# Patient Record
Sex: FEMALE | Race: WHITE
[De-identification: names, ages, dates, MRNs, and addresses within clinical notes are randomized per-mention and may not be internally consistent; named-entity substitution may affect disease eponyms.]

---

## 2020-01-13 ENCOUNTER — APPOINTMENT (OUTPATIENT)
Dept: ORTHOPEDIC SURGERY | Facility: CLINIC | Age: 35
End: 2020-01-13
Payer: COMMERCIAL

## 2020-01-13 VITALS — HEIGHT: 63 IN | WEIGHT: 121 LBS | BODY MASS INDEX: 21.44 KG/M2

## 2020-01-13 DIAGNOSIS — S53.401A UNSPECIFIED SPRAIN OF RIGHT ELBOW, INITIAL ENCOUNTER: ICD-10-CM

## 2020-01-13 PROBLEM — Z00.00 ENCOUNTER FOR PREVENTIVE HEALTH EXAMINATION: Status: ACTIVE | Noted: 2020-01-13

## 2020-01-13 PROCEDURE — 99203 OFFICE O/P NEW LOW 30 MIN: CPT

## 2020-01-13 PROCEDURE — 73070 X-RAY EXAM OF ELBOW: CPT | Mod: RT

## 2020-01-13 RX ORDER — METHYLPHENIDATE HYDROCHLORIDE 27 MG/1
TABLET, EXTENDED RELEASE ORAL
Refills: 0 | Status: ACTIVE | COMMUNITY

## 2020-01-13 NOTE — DISCUSSION/SUMMARY
[de-identified] : COLD PACK AND 2-3 ADVIL AS NEEDED\par AROM/PROM WITH NO LIMITATIONS\par NO SPORTS OR GYM 4 MORE WEEKS \par \par MRI IF NOT IMPROVED 4 WEEKS \par \par FOLLOW UP WITH DR DAVE AFTER MRI

## 2020-01-13 NOTE — ASSESSMENT
[FreeTextEntry1] : Patient and fell on her extended elbow 2 weeks ago.\par \par Medial tenderness with 1+ valgus instability demonstrated on physical exam today\par \par X-rays are unremarkable without evidence of fracture or joint space widening\par \par DX: RIGHT ELBOW MEDIAL UCL SPRAIN

## 2020-01-13 NOTE — HISTORY OF PRESENT ILLNESS
[de-identified] : RIGHT ELBOW\par DECEMBER 28 -FALL SKIING\par PAIN LEVEL 2/10\par SHARP\par WORSE WITH REACHING TWISTING. WEIGHTBEARING\par POPPING\par

## 2020-01-13 NOTE — PHYSICAL EXAM
[de-identified] : Right elbow\par \par Localized swelling and tenderness along the medial elbow. \par Patient demonstrates full extension flexion pronation and supination with minimal discomfort at end range. Tenderness in the medial elbow. \par Grade 1 instability with valgus stressing at the elbow at near full extension. \par Motor and sensation are grossly intact distally\par Capillary refill is brisk [de-identified] : RIGHT ELBOW XRAY -  \par NO OBVIOUS FRACTURE , NO SIGNIFICANT OSTEOARTHRITIS, SEPARATION OR DISLOCATION \par \par

## 2020-02-13 ENCOUNTER — APPOINTMENT (OUTPATIENT)
Dept: ORTHOPEDIC SURGERY | Facility: CLINIC | Age: 35
End: 2020-02-13
Payer: COMMERCIAL

## 2020-02-13 DIAGNOSIS — M25.521 PAIN IN RIGHT ELBOW: ICD-10-CM

## 2020-02-13 DIAGNOSIS — M94.269 CHONDROMALACIA, UNSPECIFIED KNEE: ICD-10-CM

## 2020-02-13 DIAGNOSIS — S76.119A STRAIN OF UNSPECIFIED QUADRICEPS MUSCLE, FASCIA AND TENDON, INITIAL ENCOUNTER: ICD-10-CM

## 2020-02-13 PROCEDURE — 73562 X-RAY EXAM OF KNEE 3: CPT | Mod: RT

## 2020-02-13 PROCEDURE — 99213 OFFICE O/P EST LOW 20 MIN: CPT

## 2020-02-13 NOTE — ASSESSMENT
[FreeTextEntry1] : Discussed at length with patient exam history and imaging.  Patient elects home exercise at this time as well as observation for the elbow and knee.  If persistent discomfort recommend followup in 6 weeks

## 2020-02-13 NOTE — HISTORY OF PRESENT ILLNESS
[de-identified] : Patient reports that the RIGHT knee began bothering her one month now after a fall while skiing. She reports initial bruising. Knee is  to the touch. Pain with standing, walking, and weightbearing for over 1 hour. Pain with bending knee all the way back, tight and strained. \par Patient also states she had some right elbow stiffness and was seen one month ago and that overall she is markedly improved in her elbow\par \par

## 2020-02-13 NOTE — PHYSICAL EXAM
[de-identified] : Right knee\par \par Constitutional: \par The patient is healthy-appearing and in no apparent distress. \par \par Gait:\par The patient ambulates with a normal gait and no limp.\par \par Cardiovascular System: \par The capillary refill is less than 2 seconds. \par \par Skin: \par There are no skin abnormalities.\par \par Right Knee: \par \par Bony Palpation: \par There is no tenderness of the medial joint line. \par There is no tenderness of the lateral joint line.\par There is no tenderness of the medial femoral chondyle.\par There is no tenderness of the lateral femoral chondyle.\par There is no tenderness of the tibial tubercle.\par There is no tenderness of the superior patella.\par There is no tenderness of the inferior patella.\par There is tenderness of the medial patellar facet.\par There is tenderness of the lateral patellar facet.\par \par Soft Tissue Palpation: \par There is no tenderness of the medial retinaculum.\par There is no tenderness of the lateral retinaculum.\par There is no tenderness of the quadriceps tendon.\par There is no tenderness of the patella tendon.\par There is no tenderness of the ITB.\par There is no tenderness of the pes anserine.\par \par Active Range of Motion: \par The range of motion at the knee actively and passively is full. \par \par Special Tests: \par There is a negative Apley.\par There is a negative Steinmanns. \par There is a negative Lachman and Anterior Drawer.\par There is a negative Posterior Drawer.  \par There is no varus or valgus laxity.\par \par Strength: \par There is 4+/5 hip flexion and 5/5 knee flexion and extension.  \par \par Psychiatric: \par The patient demonstrates a normal mood and affect and is active and alert.\par Right elbow\par Constitutional: \par The patient is healthy-appearing and in no apparent distress. \par \par Cardiovascular System: \par There is capillary refill less than 2 seconds. \par \par Skin: \par There is no skin abnormalities of elbow.\par \par Right Elbow: \par Appearance: \par There is no deformity, induration, redness, swelling, or warmth and a normal carrying angle. \par \par Bony Palpation: \par There is no tenderness of the medial epicondyle.\par There is no tenderness of olecranon.\par There is no tenderness of the ulnatrochlea articulation.\par There is no tenderness of the coronoid process.\par There is no tenderness of the radial head.\par There is no tenderness of the radiocapitellar joint.\par There is tenderness of the lateral epicondyle. \par \par Soft Tissue Palpation: \par There is no tenderness of the ulnar nerve.\par There is no tenderness of the biceps insertion.\par There is no tenderness of the pronator teres.\par There is no tenderness of the flexor carpi ulnaris.\par There is no tenderness of the flexor carpi radialis.\par There is no tenderness of the annular ligament of the radius.\par There is no tenderness of the brachioradialis.\par There is no tenderness of the radial collateral ligament.\par There is no tenderness of the ulnar collateral ligament.\par There is no tenderness of the antecubital fossa.\par There is trace tenderness of the extensor carpi radialis brevis.\par There is trace tenderness of the extensor carpi radialis longus.\par \par Range of Motion:  \par There is full range of motion both actively and passively. \par \par Stability:\par There is no dislocation or laxity to testing.\par  \par Strength: \par There is 5/5 elbow flexion, extension, supination and pronation.  \par \par Neurologic:\par There is normal sensation C5-T1 to light touch. \par \par Psychiatric: \par The patient demonstrates a normal mood and affect and is active and alert.\par  [de-identified] : X-ray right knee.  There is no significant bony / soft tissue abnormality, arthritis, or fracture.\par X-ray right elbow from January 13.  There is no significant bony / soft tissue abnormality, arthritis, or fracture.\par

## 2023-04-11 ENCOUNTER — ASOB RESULT (OUTPATIENT)
Age: 38
End: 2023-04-11

## 2023-04-11 ENCOUNTER — APPOINTMENT (OUTPATIENT)
Dept: ANTEPARTUM | Facility: CLINIC | Age: 38
End: 2023-04-11
Payer: COMMERCIAL

## 2023-04-11 PROCEDURE — 93976 VASCULAR STUDY: CPT

## 2023-04-11 PROCEDURE — 36415 COLL VENOUS BLD VENIPUNCTURE: CPT

## 2023-04-11 PROCEDURE — 76813 OB US NUCHAL MEAS 1 GEST: CPT

## 2023-05-08 ENCOUNTER — ASOB RESULT (OUTPATIENT)
Age: 38
End: 2023-05-08

## 2023-05-08 ENCOUNTER — APPOINTMENT (OUTPATIENT)
Dept: ANTEPARTUM | Facility: CLINIC | Age: 38
End: 2023-05-08
Payer: COMMERCIAL

## 2023-05-08 PROCEDURE — 76817 TRANSVAGINAL US OBSTETRIC: CPT

## 2023-05-08 PROCEDURE — 76805 OB US >/= 14 WKS SNGL FETUS: CPT

## 2023-05-18 ENCOUNTER — APPOINTMENT (OUTPATIENT)
Dept: ANTEPARTUM | Facility: CLINIC | Age: 38
End: 2023-05-18
Payer: COMMERCIAL

## 2023-05-18 ENCOUNTER — TRANSCRIPTION ENCOUNTER (OUTPATIENT)
Age: 38
End: 2023-05-18

## 2023-05-18 ENCOUNTER — ASOB RESULT (OUTPATIENT)
Age: 38
End: 2023-05-18

## 2023-05-18 PROCEDURE — 76946 ECHO GUIDE FOR AMNIOCENTESIS: CPT

## 2023-05-18 PROCEDURE — 59000 AMNIOCENTESIS DIAGNOSTIC: CPT

## 2023-06-12 ENCOUNTER — APPOINTMENT (OUTPATIENT)
Dept: ANTEPARTUM | Facility: CLINIC | Age: 38
End: 2023-06-12
Payer: COMMERCIAL

## 2023-06-12 ENCOUNTER — ASOB RESULT (OUTPATIENT)
Age: 38
End: 2023-06-12

## 2023-06-12 PROCEDURE — 76811 OB US DETAILED SNGL FETUS: CPT

## 2023-06-12 PROCEDURE — 76817 TRANSVAGINAL US OBSTETRIC: CPT

## 2023-07-25 ENCOUNTER — APPOINTMENT (OUTPATIENT)
Dept: ANTEPARTUM | Facility: CLINIC | Age: 38
End: 2023-07-25
Payer: COMMERCIAL

## 2023-07-25 ENCOUNTER — ASOB RESULT (OUTPATIENT)
Age: 38
End: 2023-07-25

## 2023-07-25 PROCEDURE — 76816 OB US FOLLOW-UP PER FETUS: CPT

## 2023-07-25 PROCEDURE — 76819 FETAL BIOPHYS PROFIL W/O NST: CPT
